# Patient Record
Sex: MALE | Race: WHITE
[De-identification: names, ages, dates, MRNs, and addresses within clinical notes are randomized per-mention and may not be internally consistent; named-entity substitution may affect disease eponyms.]

---

## 2019-04-17 ENCOUNTER — HOSPITAL ENCOUNTER (OUTPATIENT)
Dept: HOSPITAL 84 - D.LABREF | Age: 39
Discharge: HOME | End: 2019-04-17
Attending: UROLOGY
Payer: COMMERCIAL

## 2019-04-17 DIAGNOSIS — R31.9: Primary | ICD-10-CM

## 2019-06-07 ENCOUNTER — HOSPITAL ENCOUNTER (OUTPATIENT)
Dept: HOSPITAL 84 - D.OPS | Age: 39
Discharge: HOME | End: 2019-06-07
Attending: UROLOGY
Payer: COMMERCIAL

## 2019-06-07 VITALS — BODY MASS INDEX: 27.3 KG/M2

## 2019-06-07 VITALS — DIASTOLIC BLOOD PRESSURE: 79 MMHG | SYSTOLIC BLOOD PRESSURE: 122 MMHG

## 2019-06-07 DIAGNOSIS — N30.10: ICD-10-CM

## 2019-06-07 DIAGNOSIS — N32.89: ICD-10-CM

## 2019-06-07 DIAGNOSIS — N39.41: Primary | ICD-10-CM

## 2019-06-07 DIAGNOSIS — Z01.812: ICD-10-CM

## 2019-06-09 NOTE — OP
PATIENT NAME:  MARQUITA VALENTINO                              MEDICAL RECORD: [id number]
:80                                             LOCATION:D.OPS          
                                                         ADMISSION DATE:        
SURGEON:  TL LUCIO MD              
 
 
DATE OF OPERATION:  2019
 
SURGEON:  Tl Lucio MD
 
ANESTHESIA:  TIVA by ADRIANO Jasmine CRNA
 
DIAGNOSIS:  Urge urinary incontinence, interstitial cystitis, possible bladder
neck contracture.
 
PROCEDURES:  Cystoscopy, hydrodistention of the bladder, intravesical Botox
injection 100 units, and intravesical Rimso instillation 50 mL.
 
FINDINGS:  No penile or urethral strictures.  Tall bladder neck.  Nonobstructive
lateral lobes of the prostate.  Heavily trabeculated bladder with no bladder
tumors.  Single ureteral orifices bilaterally.  Diffuse bladder inflammation
with glomerulations.
 
BLOOD LOSS:  None.
 
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  He has a longstanding
history of urinary urgency with occasional urge incontinence and nocturia x4. 
This has been a problem since he was in high school.  He saw urologist in [facility].  Oxybutynin was tried, but it did not help.  He also had a lot of
adverse side effects from the oxybutynin.  Myrbetriq 50 mg did help.  However,
he finds this to be rather expensive.  He has issues of suprapubic pain
radiating to the peritoneum.  These are suggestive of interstitial cystitis as
well as urge urinary incontinence.  He has no medication allergies.  He was
given Ancef on call to the OR.  We are going to perform cystoscopy.  Bladder
inflammation is found and hydrodistention and intravesical Rimso will be
instilled.  For the urge incontinence, he will be getting intravesical Botox
injection.
 
DESCRIPTION OF PROCEDURE:  The patient was given IV sedation.  He was then
placed into the lithotomy position.  He was then prepped and draped.  Lidocaine
jelly was inserted into the urethra.  A 21-French cystoscope with 30-degree lens
was used for visualization.  Findings as outlined above.  There was inflammation
of the bladder, but also very heavily trabeculated bladder suggestive of a
bladder neck obstruction.  The bladder was filled to over 600 mL and the
pressure was maintained for about 2 minutes.  The bladder became quite angry and
red from the hydrodistention.  Glomerulations were seen.  The bladder volume was
then decreased by opening up the stopcock on the cystoscope.  We then injected
100 units of intravesical Botox.  This was done with 10 units injected at 10
different locations, excluding the ureteral orifices on the trigone of the
bladder.  Once the Botox had been injected, then, the bladder was emptied
through the cystoscope sheath and the scope was removed entirely.  A red rubber
catheter was then inserted.  Through the lumen of the red rubber catheter, we
instilled 50 mL of Rimso solution.  Once the solution was in the bladder, the
catheter was removed, leaving the solution in the bladder.  He will hold it in
for about 15 minutes and then void it out.  I will see him in followup in 2
weeks' time.  If he is still symptomatic in terms of the bladder pain, then he
may require another treatment of Rimso at that time.
 
 
 
 
OPERATIVE REPORT                               [id number]    MARQUITA VALENTINO            
 
 
TRANSINT:[id number] Voice Confirmation ID: [id number] DOCUMENT ID: [id number]
                                           
                                           TL LUCIO MD              
 
 
 
Electronically Signed by TL LUCIO on 19 at 1131
 
 
 
 
 
 
 
 
 
 
 
 
 
 
 
 
 
 
 
 
 
 
 
 
 
 
 
 
 
 
 
 
 
 
 
 
 
 
 
 
CC:                                                             [id number]
DICTATION DATE: 19 1419     :     19      [facility] 
                                                                      19
[facility]                                          
[address] [place] [address]

## 2021-04-12 ENCOUNTER — HOSPITAL ENCOUNTER (OUTPATIENT)
Dept: HOSPITAL 84 - D.LABREF | Age: 41
Discharge: HOME | End: 2021-04-12
Attending: SURGERY
Payer: COMMERCIAL

## 2021-04-12 VITALS — BODY MASS INDEX: 27.3 KG/M2

## 2021-04-12 DIAGNOSIS — L02.212: Primary | ICD-10-CM
